# Patient Record
Sex: MALE | Race: WHITE | NOT HISPANIC OR LATINO | URBAN - METROPOLITAN AREA
[De-identification: names, ages, dates, MRNs, and addresses within clinical notes are randomized per-mention and may not be internally consistent; named-entity substitution may affect disease eponyms.]

---

## 2018-07-12 ENCOUNTER — OFFICE VISIT (OUTPATIENT)
Dept: URBAN - METROPOLITAN AREA CLINIC 58 | Facility: CLINIC | Age: 83
End: 2018-07-12
Payer: MEDICARE

## 2018-07-12 PROCEDURE — 99213 OFFICE O/P EST LOW 20 MIN: CPT | Performed by: OPTOMETRIST

## 2018-07-12 ASSESSMENT — INTRAOCULAR PRESSURE
OS: 12
OD: 12

## 2018-07-12 NOTE — IMPRESSION/PLAN
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 Plan: Discussed diagnosis in detail with pt. Will continue to observe patient's IOP and vision. Continue latanoprost 1gtt OU qhs. Continue brimonidine and dorzolamide bid OU.   Pt told he should probably be able to get dorzolamide now

## 2019-01-11 ENCOUNTER — OFFICE VISIT (OUTPATIENT)
Dept: URBAN - METROPOLITAN AREA CLINIC 58 | Facility: CLINIC | Age: 84
End: 2019-01-11
Payer: MEDICARE

## 2019-01-11 PROCEDURE — 92014 COMPRE OPH EXAM EST PT 1/>: CPT | Performed by: OPTOMETRIST

## 2019-01-11 PROCEDURE — 92133 CPTRZD OPH DX IMG PST SGM ON: CPT | Performed by: OPTOMETRIST

## 2019-01-11 ASSESSMENT — INTRAOCULAR PRESSURE
OD: 12
OS: 13
OD: 15
OS: 17

## 2019-01-11 ASSESSMENT — VISUAL ACUITY
OD: 20/40
OS: 20/30

## 2019-01-11 ASSESSMENT — KERATOMETRY
OS: 44.00
OD: 43.63

## 2019-01-11 NOTE — IMPRESSION/PLAN
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 Plan: Discussed diagnosis in detail with patient. No treatment is required at this time. Will continue to observe condition and or symptoms. Call if [address] worsens.

## 2019-01-11 NOTE — IMPRESSION/PLAN
Impression: Low-tension glaucoma, bilateral, mild stage: S12.5040. Plan: Discussed diagnosis in detail with pt. Will continue to observe patient's IOP and vision. Continue latanoprost 1gtt OU qhs. Continue brimonidine and dorzolamide bid OU.

## 2019-07-11 ENCOUNTER — OFFICE VISIT (OUTPATIENT)
Dept: URBAN - METROPOLITAN AREA CLINIC 58 | Facility: CLINIC | Age: 84
End: 2019-07-11
Payer: MEDICARE

## 2019-07-11 DIAGNOSIS — H50.51 ESOPHORIA: ICD-10-CM

## 2019-07-11 PROCEDURE — 92083 EXTENDED VISUAL FIELD XM: CPT | Performed by: OPTOMETRIST

## 2019-07-11 PROCEDURE — 92012 INTRM OPH EXAM EST PATIENT: CPT | Performed by: OPTOMETRIST

## 2019-07-11 ASSESSMENT — INTRAOCULAR PRESSURE
OS: 12
OD: 12

## 2019-07-11 NOTE — IMPRESSION/PLAN
Impression: Low-tension glaucoma, bilateral, mild stage: K16.5350. Plan: Discussed diagnosis in detail with pt. Will continue to observe patient's IOP and vision. Continue latanoprost 1gtt OU qhs. Continue brimonidine and dorzolamide bid OU.

## 2019-07-11 NOTE — IMPRESSION/PLAN
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 Plan: Pt says that his occasional double vision is not that bad and he doesn't want to do anything about it at this time.

## 2020-12-31 ENCOUNTER — OFFICE VISIT (OUTPATIENT)
Dept: URBAN - METROPOLITAN AREA CLINIC 58 | Facility: CLINIC | Age: 85
End: 2020-12-31
Payer: MEDICARE

## 2020-12-31 PROCEDURE — 92012 INTRM OPH EXAM EST PATIENT: CPT | Performed by: OPTOMETRIST

## 2020-12-31 PROCEDURE — 92133 CPTRZD OPH DX IMG PST SGM ON: CPT | Performed by: OPTOMETRIST

## 2020-12-31 ASSESSMENT — INTRAOCULAR PRESSURE
OS: 13
OD: 12

## 2020-12-31 NOTE — IMPRESSION/PLAN
Impression: Low-tension glaucoma, bilateral, mild stage: Z30.7869. Plan: Discussed diagnosis in detail with pt. Will continue to observe patient's IOP and vision. Continue latanoprost 1gtt OU qhs. Continue dorzolamide bid OU. Stop Brimonidine.

## 2021-10-04 ENCOUNTER — OFFICE VISIT (OUTPATIENT)
Dept: URBAN - METROPOLITAN AREA CLINIC 22 | Facility: CLINIC | Age: 86
End: 2021-10-04
Payer: MEDICARE

## 2021-10-04 DIAGNOSIS — H40.1231 LOW-TENSION GLAUCOMA, BILATERAL, MILD STAGE: Primary | ICD-10-CM

## 2021-10-04 DIAGNOSIS — H04.123 DRY EYE SYNDROME OF BILATERAL LACRIMAL GLANDS: ICD-10-CM

## 2021-10-04 PROCEDURE — 99213 OFFICE O/P EST LOW 20 MIN: CPT | Performed by: STUDENT IN AN ORGANIZED HEALTH CARE EDUCATION/TRAINING PROGRAM

## 2021-10-04 RX ORDER — LATANOPROST 50 UG/ML
0.005 % SOLUTION OPHTHALMIC
Qty: 7.5 | Refills: 3 | Status: ACTIVE
Start: 2021-10-04

## 2021-10-04 ASSESSMENT — INTRAOCULAR PRESSURE
OS: 6
OD: 8

## 2021-10-04 NOTE — IMPRESSION/PLAN
Impression: Dry eye syndrome of bilateral lacrimal glands: H04.123. Plan: Stable vision OU. Rx artificial tears BID-QID OU.

## 2021-10-04 NOTE — IMPRESSION/PLAN
Impression: Low-tension glaucoma, bilateral, mild stage: J06.1822. -- current tx: latan QHS OU, dorz BID OU
-- OCT 12/31/20: OD 86; bdl sup OS 66; thin sup (poor scan) -- HVF 07/11/19: OD inf [facility]ary[facility] step OS inf [place] Plan: Stable IOP OU. Continue latanoprost QHS OU and dorzolamide BID OU (discussed, pt has been using only QD). RTC in 2 months for HVF 24-2, OCT RNFL, and DFE.